# Patient Record
Sex: MALE | Race: WHITE | NOT HISPANIC OR LATINO | URBAN - METROPOLITAN AREA
[De-identification: names, ages, dates, MRNs, and addresses within clinical notes are randomized per-mention and may not be internally consistent; named-entity substitution may affect disease eponyms.]

---

## 2017-05-10 ENCOUNTER — OUTPATIENT (OUTPATIENT)
Dept: OUTPATIENT SERVICES | Facility: HOSPITAL | Age: 52
LOS: 1 days | End: 2017-05-10

## 2017-05-18 ENCOUNTER — OUTPATIENT (OUTPATIENT)
Dept: OUTPATIENT SERVICES | Facility: HOSPITAL | Age: 52
LOS: 1 days | End: 2017-05-18

## 2020-06-19 ENCOUNTER — APPOINTMENT (OUTPATIENT)
Dept: ORTHOPEDIC SURGERY | Facility: CLINIC | Age: 55
End: 2020-06-19
Payer: COMMERCIAL

## 2020-06-19 VITALS
HEART RATE: 89 BPM | SYSTOLIC BLOOD PRESSURE: 130 MMHG | HEIGHT: 72 IN | WEIGHT: 175 LBS | BODY MASS INDEX: 23.7 KG/M2 | DIASTOLIC BLOOD PRESSURE: 87 MMHG | TEMPERATURE: 98.3 F

## 2020-06-19 PROCEDURE — 73030 X-RAY EXAM OF SHOULDER: CPT | Mod: LT

## 2020-06-19 PROCEDURE — 20610 DRAIN/INJ JOINT/BURSA W/O US: CPT | Mod: LT

## 2020-06-19 PROCEDURE — 99203 OFFICE O/P NEW LOW 30 MIN: CPT | Mod: 25

## 2020-06-19 NOTE — PHYSICAL EXAM
[de-identified] : Pt is pleasant 54 yo male, AAOx3 with no apparent distress, 23 BMI.  Physical examination of both shoulders reveals normal contours with no deformity, skin intact, with no signs of infection, no erythema, no swelling, no discoloration, no distal lymphedema, no patholaxity, no muscle atrophy noted. Minimal AC tenderness to palpation, no anterior/posterior capsule pain. ROM of Left shoulder reveals forward flexion to 125 °,  external rotation 35 °,  IR to L1 . Motor strength 5/5 in ER, IR, and FF in the scapular plane with pain on FF. No neurological deficits noted.  [de-identified] : X-rays were ordered, obtained, and interpreted by me today: 4 views of the left shoulder demonstrate no evidence of arthritis, no calcifications, with no acute fracture or dislocation.

## 2020-06-19 NOTE — CONSULT LETTER
[Dear  ___] : Dear  [unfilled], [FreeTextEntry1] : I had the pleasure of evaluating your patient in the office today for evaluation of left shoulder pain secondary to rotator cuff tendinitis. I have enclosed a copy of today's office notes for your charts and for your review.\par \par Sincerely, \par \par Juan Antonio Melvin M.D.\par Professor and \par Department of Orthopedic Surgery\par [facility]

## 2020-06-19 NOTE — DISCUSSION/SUMMARY
[de-identified] : 54 y/o male with left shoulder pain secondary to rotator cuff tendinitis. A lengthy discussion was held regarding the patient’s condition and treatment options including all risks, benefits, prognosis and outcomes of each were discussed in detail. The patient would like to continue with conservative management at this time and was advised on the use of NSAIDS for pain control, icing, activity modification, and possible cortisone injections. A cortisone injection was given today into shoulder and subacromial space. A prescription was provided today for left shoulder MRI and to begin physical therapy.  I will contact him after MRI results are available to discuss further options with him. The patient will contact me if there are any concerns otherwise follow up will be on a prn basis. The patient express understanding and all questions were answered.

## 2020-06-19 NOTE — PROCEDURE
[de-identified] : After careful discussion regarding the risks versus benefits of a corticosteroid injection the patient has elected to proceed with that treatment. Under sterile conditions, the patient received  a left shoulder injection into the subacromial and glenohumeral space with 8 cc of half percent Marcaine without epinephrine and 2 cc of Betamethasone. The site was cleaned and a bandaid was applied. The patient tolerated the injection without problem.\par

## 2020-06-19 NOTE — HISTORY OF PRESENT ILLNESS
[de-identified] : 54 y/o RHD male who is a  with history of 11/13/14 Right shoulder arthroscopic SAD with Laura and partial synovectomy presents with left shoulder pain since February. He denies any injury or trauma. He states that his symptoms are getting worse. He states the pain is a 9/10 yesterday. He gets a lot of muscle spasm. He has decrease ROM and strength. He denies any numbness or tingling.

## 2020-09-17 ENCOUNTER — APPOINTMENT (OUTPATIENT)
Dept: ORTHOPEDIC SURGERY | Facility: CLINIC | Age: 55
End: 2020-09-17
Payer: COMMERCIAL

## 2020-09-17 VITALS — TEMPERATURE: 98.2 F

## 2020-09-17 DIAGNOSIS — M75.82 OTHER SHOULDER LESIONS, LEFT SHOULDER: ICD-10-CM

## 2020-09-17 PROCEDURE — 99214 OFFICE O/P EST MOD 30 MIN: CPT

## 2020-09-18 PROBLEM — M75.82 TENDINITIS OF LEFT ROTATOR CUFF: Status: ACTIVE | Noted: 2020-06-19

## 2020-09-18 NOTE — CONSULT LETTER
[Dear  ___] : Dear  [unfilled], [FreeTextEntry1] : I had the pleasure of evaluating your patient in the office today for evaluation of left shoulder pain secondary to adhesive capsulitis and impingement. The patient would like to proceed with surgical intervention in the form of subacromial decompression and capsular release. Patient will require medical clearance for the aforementioned procedure. The patient would like to I have enclosed a copy of today's office notes for your charts and for your review.\par \par Sincerely, \par \par Juan Antonio Melvin M.D.\par Professor and \par Department of Orthopedic Surgery\par [facility]

## 2020-09-18 NOTE — DISCUSSION/SUMMARY
[Surgical risks reviewed] : Surgical risks reviewed [de-identified] : 56 y/o male with left shoulder pain secondary to adhesive capsulitis and impingement. A lengthy discussion was held regarding the patient’s condition and treatment options including all risks, benefits, prognosis and outcomes of each were discussed in detail. The patient would like to proceed with surgery at this time. Surgery will be tentatively scheduled 10/12/20 for capsular release and subacromial decompression. Discussed that patient will need to pursue aggressive ROM and PT post-operatively to prevent recurrent stiffness. The patient will need medical clearance before proceeding with surgery. All the patient's questions were answered at this time.\par \par \par

## 2020-09-18 NOTE — PHYSICAL EXAM
[de-identified] : Pt is pleasant 54 yo male, AAOx3 with no apparent distress, 23 BMI.  Physical examination of both shoulders reveals normal contours with no deformity, skin intact, with no signs of infection, no erythema, no swelling, no discoloration, no distal lymphedema, no patholaxity, no muscle atrophy noted. ROM of Left shoulder reveals forward flexion to 105 ° (with limitation to passive ROM),  external rotation 20 °,  IR to L4. Motor strength 4/5 in ER, IR, and FF in the scapular plane with pain on FF. No neurological deficits noted. \par  [de-identified] : No additional imaging at this visit.\par \par Prior X-rays: 4 views of the left shoulder demonstrate type 2 acromial morphology, with no evidence of arthritis, no calcifications, with no acute fracture or dislocation. \par \par Prior MRI L Shoulder ([facility], 6/24/20): rotator cuff tendonitis with inferior capsule thickening.

## 2020-09-18 NOTE — HISTORY OF PRESENT ILLNESS
[de-identified] : 56 y/o RHD male who is a  with history of 11/13/14 Right shoulder arthroscopic SAD with Laura and partial synovectomy and was seen in the past for left shoulder tendonitis and was treated with an injection which did help presents with continued pain. He has finished therapy but his shoulder is still stiff. He states the pain is a 2/10 pain at this visit, which he takes Naprosyn. He has decrease ROM and strength. He denies any numbness or tingling.

## 2020-10-01 ENCOUNTER — OUTPATIENT (OUTPATIENT)
Dept: OUTPATIENT SERVICES | Facility: HOSPITAL | Age: 55
LOS: 1 days | End: 2020-10-01
Payer: COMMERCIAL

## 2020-10-01 VITALS
WEIGHT: 171.96 LBS | RESPIRATION RATE: 18 BRPM | OXYGEN SATURATION: 98 % | SYSTOLIC BLOOD PRESSURE: 126 MMHG | HEART RATE: 56 BPM | TEMPERATURE: 97 F | DIASTOLIC BLOOD PRESSURE: 84 MMHG | HEIGHT: 72 IN

## 2020-10-01 DIAGNOSIS — Z01.818 ENCOUNTER FOR OTHER PREPROCEDURAL EXAMINATION: ICD-10-CM

## 2020-10-01 DIAGNOSIS — M75.40 IMPINGEMENT SYNDROME OF UNSPECIFIED SHOULDER: ICD-10-CM

## 2020-10-01 DIAGNOSIS — Z98.890 OTHER SPECIFIED POSTPROCEDURAL STATES: Chronic | ICD-10-CM

## 2020-10-01 DIAGNOSIS — Z90.49 ACQUIRED ABSENCE OF OTHER SPECIFIED PARTS OF DIGESTIVE TRACT: Chronic | ICD-10-CM

## 2020-10-01 DIAGNOSIS — M75.02 ADHESIVE CAPSULITIS OF LEFT SHOULDER: ICD-10-CM

## 2020-10-01 LAB
ANION GAP SERPL CALC-SCNC: 10 MMOL/L — SIGNIFICANT CHANGE UP (ref 5–17)
BUN SERPL-MCNC: 10 MG/DL — SIGNIFICANT CHANGE UP (ref 7–23)
CALCIUM SERPL-MCNC: 9.7 MG/DL — SIGNIFICANT CHANGE UP (ref 8.4–10.5)
CHLORIDE SERPL-SCNC: 98 MMOL/L — SIGNIFICANT CHANGE UP (ref 96–108)
CO2 SERPL-SCNC: 27 MMOL/L — SIGNIFICANT CHANGE UP (ref 22–31)
CREAT SERPL-MCNC: 0.79 MG/DL — SIGNIFICANT CHANGE UP (ref 0.5–1.3)
GLUCOSE SERPL-MCNC: 108 MG/DL — HIGH (ref 70–99)
HCT VFR BLD CALC: 43.8 % — SIGNIFICANT CHANGE UP (ref 39–50)
HGB BLD-MCNC: 15 G/DL — SIGNIFICANT CHANGE UP (ref 13–17)
MCHC RBC-ENTMCNC: 30.4 PG — SIGNIFICANT CHANGE UP (ref 27–34)
MCHC RBC-ENTMCNC: 34.2 GM/DL — SIGNIFICANT CHANGE UP (ref 32–36)
MCV RBC AUTO: 88.7 FL — SIGNIFICANT CHANGE UP (ref 80–100)
NRBC # BLD: 0 /100 WBCS — SIGNIFICANT CHANGE UP (ref 0–0)
PLATELET # BLD AUTO: 239 K/UL — SIGNIFICANT CHANGE UP (ref 150–400)
POTASSIUM SERPL-MCNC: 4.4 MMOL/L — SIGNIFICANT CHANGE UP (ref 3.5–5.3)
POTASSIUM SERPL-SCNC: 4.4 MMOL/L — SIGNIFICANT CHANGE UP (ref 3.5–5.3)
RBC # BLD: 4.94 M/UL — SIGNIFICANT CHANGE UP (ref 4.2–5.8)
RBC # FLD: 11.9 % — SIGNIFICANT CHANGE UP (ref 10.3–14.5)
SODIUM SERPL-SCNC: 135 MMOL/L — SIGNIFICANT CHANGE UP (ref 135–145)
WBC # BLD: 7.88 K/UL — SIGNIFICANT CHANGE UP (ref 3.8–10.5)
WBC # FLD AUTO: 7.88 K/UL — SIGNIFICANT CHANGE UP (ref 3.8–10.5)

## 2020-10-01 PROCEDURE — G0463: CPT

## 2020-10-01 PROCEDURE — 85027 COMPLETE CBC AUTOMATED: CPT

## 2020-10-01 PROCEDURE — 80048 BASIC METABOLIC PNL TOTAL CA: CPT

## 2020-10-01 RX ORDER — SODIUM CHLORIDE 9 MG/ML
3 INJECTION INTRAMUSCULAR; INTRAVENOUS; SUBCUTANEOUS EVERY 8 HOURS
Refills: 0 | Status: DISCONTINUED | OUTPATIENT
Start: 2020-10-08 | End: 2020-10-23

## 2020-10-01 RX ORDER — LIDOCAINE HCL 20 MG/ML
0.2 VIAL (ML) INJECTION ONCE
Refills: 0 | Status: DISCONTINUED | OUTPATIENT
Start: 2020-10-08 | End: 2020-10-23

## 2020-10-01 NOTE — H&P PST ADULT - NSICDXPASTMEDICALHX_GEN_ALL_CORE_FT
PAST MEDICAL HISTORY:  Capsulitis, adhesive shoulder     History of esophageal spasm 3-4 years ago   resolved     PAST MEDICAL HISTORY:  Bulging lumbar disc L4-5    Capsulitis, adhesive shoulder     History of esophageal spasm 3-4 years ago   resolved

## 2020-10-01 NOTE — H&P PST ADULT - FALL HARM RISK TYPE OF ASSESSMENT
Problem: Patient Care Overview (Adult)  Goal: Plan of Care Review  Outcome: Ongoing (interventions implemented as appropriate)    11/03/17 1055   Coping/Psychosocial Response Interventions   Plan Of Care Reviewed With patient   Patient Care Overview   Progress improving   Outcome Evaluation   Outcome Summary/Follow up Plan less anxious           
Problem: Patient Care Overview (Adult)  Goal: Plan of Care Review  Outcome: Ongoing (interventions implemented as appropriate)    11/03/17 1234   Coping/Psychosocial Response Interventions   Plan Of Care Reviewed With patient   Patient Care Overview   Progress improving   Outcome Evaluation   Outcome Summary/Follow up Plan prn meds as tolerated meets pacu discharge criteria         Problem: Perioperative Period (Adult)  Goal: Signs and Symptoms of Listed Potential Problems Will be Absent or Manageable (Perioperative Period)  Outcome: Ongoing (interventions implemented as appropriate)      
Problem: Patient Care Overview (Adult)  Goal: Plan of Care Review  Outcome: Outcome(s) achieved Date Met:  11/03/17 11/03/17 1416   Coping/Psychosocial Response Interventions   Plan Of Care Reviewed With patient;family   Patient Care Overview   Progress improving   Outcome Evaluation   Outcome Summary/Follow up Plan dischg criteria met           
Problem: Perioperative Period (Adult)  Goal: Signs and Symptoms of Listed Potential Problems Will be Absent or Manageable (Perioperative Period)  Outcome: Ongoing (interventions implemented as appropriate)    11/03/17 4418   Perioperative Period   Problems Assessed (Perioperative Period) pain;hypothermia;hypoxia/hypoxemia;perioperative injury;situational response   Problems Present (Perioperative Period) situational response           
Problem: Perioperative Period (Adult)  Goal: Signs and Symptoms of Listed Potential Problems Will be Absent or Manageable (Perioperative Period)  Outcome: Outcome(s) achieved Date Met:  11/03/17      
Admission

## 2020-10-01 NOTE — H&P PST ADULT - HISTORY OF PRESENT ILLNESS
55 year old male is being seen in preadmission testing for scheduled left shoulder arthroscopy adhesiolysis with subacromial decompression on 10/8/2020. History of esophageal spasm 3-4 years ago (resolved)      Scheduled for covid-19 swab on 10/5/2020, [facility] 55 year old male is being seen in preadmission testing for scheduled left shoulder arthroscopy adhesiolysis with subacromial decompression on 10/8/2020. History of esophageal spasm 3-4 years ago (resolved), anxiety disorders, bulging lumbar discs (L4-5)      Scheduled for covid-19 swab on 10/5/2020, [facility] 55 year old male is being seen in preadmission testing for scheduled left shoulder arthroscopy adhesiolysis with subacromial decompression on 10/8/2020. History of esophageal spasm 3-4 years ago (resolved), anxiety disorders, bulging lumbar discs (L4-5).      Scheduled for covid-19 swab on 10/5/2020, [facility]

## 2020-10-01 NOTE — H&P PST ADULT - NSICDXPASTSURGICALHX_GEN_ALL_CORE_FT
PAST SURGICAL HISTORY:  H/O meniscectomy of right knee     H/O shoulder surgery right shoulder    History of cholecystectomy 2 years ago    History of knee surgery left

## 2020-10-01 NOTE — H&P PST ADULT - ASSESSMENT
This is a 55 year old male c/o left shoulder pain, uses marijuana with some relief, pain increases with certain movement. scheduled for surgery on 10/8/2020.

## 2020-10-04 DIAGNOSIS — Z01.818 ENCOUNTER FOR OTHER PREPROCEDURAL EXAMINATION: ICD-10-CM

## 2020-10-05 ENCOUNTER — APPOINTMENT (OUTPATIENT)
Dept: DISASTER EMERGENCY | Facility: CLINIC | Age: 55
End: 2020-10-05

## 2020-10-07 ENCOUNTER — TRANSCRIPTION ENCOUNTER (OUTPATIENT)
Age: 55
End: 2020-10-07

## 2020-10-07 ENCOUNTER — APPOINTMENT (OUTPATIENT)
Dept: DISASTER EMERGENCY | Facility: CLINIC | Age: 55
End: 2020-10-07

## 2020-10-07 ENCOUNTER — LABORATORY RESULT (OUTPATIENT)
Age: 55
End: 2020-10-07

## 2020-10-07 PROBLEM — Z87.19 PERSONAL HISTORY OF OTHER DISEASES OF THE DIGESTIVE SYSTEM: Chronic | Status: ACTIVE | Noted: 2020-10-01

## 2020-10-07 PROBLEM — M51.26 OTHER INTERVERTEBRAL DISC DISPLACEMENT, LUMBAR REGION: Chronic | Status: ACTIVE | Noted: 2020-10-01

## 2020-10-07 PROBLEM — M75.00 ADHESIVE CAPSULITIS OF UNSPECIFIED SHOULDER: Chronic | Status: ACTIVE | Noted: 2020-10-01

## 2020-10-08 ENCOUNTER — APPOINTMENT (OUTPATIENT)
Dept: ORTHOPEDIC SURGERY | Facility: HOSPITAL | Age: 55
End: 2020-10-08

## 2020-10-08 ENCOUNTER — OUTPATIENT (OUTPATIENT)
Dept: OUTPATIENT SERVICES | Facility: HOSPITAL | Age: 55
LOS: 1 days | End: 2020-10-08
Payer: COMMERCIAL

## 2020-10-08 VITALS
RESPIRATION RATE: 19 BRPM | DIASTOLIC BLOOD PRESSURE: 66 MMHG | HEART RATE: 72 BPM | OXYGEN SATURATION: 96 % | SYSTOLIC BLOOD PRESSURE: 126 MMHG

## 2020-10-08 VITALS
WEIGHT: 171.96 LBS | OXYGEN SATURATION: 97 % | TEMPERATURE: 98 F | RESPIRATION RATE: 18 BRPM | HEART RATE: 50 BPM | DIASTOLIC BLOOD PRESSURE: 80 MMHG | HEIGHT: 72 IN | SYSTOLIC BLOOD PRESSURE: 121 MMHG

## 2020-10-08 DIAGNOSIS — Z98.890 OTHER SPECIFIED POSTPROCEDURAL STATES: Chronic | ICD-10-CM

## 2020-10-08 DIAGNOSIS — M75.40 IMPINGEMENT SYNDROME OF UNSPECIFIED SHOULDER: ICD-10-CM

## 2020-10-08 DIAGNOSIS — Z90.49 ACQUIRED ABSENCE OF OTHER SPECIFIED PARTS OF DIGESTIVE TRACT: Chronic | ICD-10-CM

## 2020-10-08 DIAGNOSIS — M75.02 ADHESIVE CAPSULITIS OF LEFT SHOULDER: ICD-10-CM

## 2020-10-08 PROCEDURE — 29826 SHO ARTHRS SRG DECOMPRESSION: CPT | Mod: LT

## 2020-10-08 PROCEDURE — 29825 SHO ARTHRS SRG LSS&RESCJ ADS: CPT | Mod: LT

## 2020-10-08 RX ORDER — LOPERAMIDE HCL 2 MG
1 TABLET ORAL
Qty: 0 | Refills: 0 | DISCHARGE

## 2020-10-08 RX ORDER — ALPRAZOLAM 0.25 MG
1 TABLET ORAL
Qty: 0 | Refills: 0 | DISCHARGE

## 2020-10-08 RX ORDER — CHLORHEXIDINE GLUCONATE 213 G/1000ML
1 SOLUTION TOPICAL ONCE
Refills: 0 | Status: COMPLETED | OUTPATIENT
Start: 2020-10-08 | End: 2020-10-08

## 2020-10-08 RX ORDER — SODIUM CHLORIDE 9 MG/ML
1000 INJECTION, SOLUTION INTRAVENOUS
Refills: 0 | Status: DISCONTINUED | OUTPATIENT
Start: 2020-10-08 | End: 2020-10-23

## 2020-10-08 RX ORDER — OXYCODONE HYDROCHLORIDE 5 MG/1
5 TABLET ORAL ONCE
Refills: 0 | Status: DISCONTINUED | OUTPATIENT
Start: 2020-10-08 | End: 2020-10-08

## 2020-10-08 RX ORDER — ONDANSETRON 8 MG/1
4 TABLET, FILM COATED ORAL ONCE
Refills: 0 | Status: DISCONTINUED | OUTPATIENT
Start: 2020-10-08 | End: 2020-10-23

## 2020-10-08 RX ADMIN — CHLORHEXIDINE GLUCONATE 1 APPLICATION(S): 213 SOLUTION TOPICAL at 13:23

## 2020-10-08 NOTE — ASU DISCHARGE PLAN (ADULT/PEDIATRIC) - CALL YOUR DOCTOR IF YOU HAVE ANY OF THE FOLLOWING:
Wound/Surgical Site with redness, or foul smelling discharge or pus/Swelling that gets worse/Bleeding that does not stop/Pain not relieved by Medications/Fever greater than (need to indicate Fahrenheit or Celsius)

## 2020-10-08 NOTE — ASU DISCHARGE PLAN (ADULT/PEDIATRIC) - CARE PROVIDER_API CALL
Juan Antonio Melvin)  Orthopaedic Surgery  [address], [address]  [place] [address]  Phone: [phone]  Fax: [phone]  Follow Up Time:

## 2020-10-08 NOTE — BRIEF OPERATIVE NOTE - NSICDXBRIEFPOSTOP_GEN_ALL_CORE_FT
POST-OP DIAGNOSIS:  Secondary adhesive capsulitis of left shoulder 08-Oct-2020 17:14:58  Elin Ryan

## 2020-10-08 NOTE — ASU DISCHARGE PLAN (ADULT/PEDIATRIC) - ASU DC SPECIAL INSTRUCTIONSFT
Follow up with Dr Melvin in 7-14 days. Call office for appointment. Take medications as prescribed. Keep dressing clean, dry, and intact. Rest, ice, and elevate affected extremity. Weightbearing as tolerated left upper extremity in sling for comfort. Further instructions detailed in packet.

## 2020-10-08 NOTE — ASU PATIENT PROFILE, ADULT - PMH
Bulging lumbar disc  L4-5  Capsulitis, adhesive shoulder    History of esophageal spasm  3-4 years ago   resolved

## 2020-10-08 NOTE — ASU PATIENT PROFILE, ADULT - PSH
H/O meniscectomy of right knee    H/O shoulder surgery  right shoulder  History of cholecystectomy  2 years ago  History of knee surgery  left

## 2020-10-08 NOTE — ASU DISCHARGE PLAN (ADULT/PEDIATRIC) - NURSING INSTRUCTIONS
Next dose of Tylenol will be on or after ___________ ,today/tonight, If needed for pain/cramps. Your first dose of Tylenol was given at ___________. Do not exceed more than 4000mg of Tylenol in one 24 hour setting.   Next dose of NSAIDS (Motrin/Alleve) will be on or after ___________ today if needed for pain/cramps. Next dose of Tylenol will be on or after ___11:00PM________ ,today/tonight, If needed for pain/cramps. Your first dose of Tylenol was given at __3:51PM_________. Do not exceed more than 4000mg of Tylenol in one 24 hour setting.   Next dose of NSAIDS (Motrin/Alleve) will be on or after ___8:00________ today if needed for pain/cramps.

## 2020-10-27 ENCOUNTER — APPOINTMENT (OUTPATIENT)
Dept: ORTHOPEDIC SURGERY | Facility: CLINIC | Age: 55
End: 2020-10-27
Payer: COMMERCIAL

## 2020-10-27 VITALS — TEMPERATURE: 97.8 F

## 2020-10-27 DIAGNOSIS — M75.02 ADHESIVE CAPSULITIS OF LEFT SHOULDER: ICD-10-CM

## 2020-10-27 PROCEDURE — 73030 X-RAY EXAM OF SHOULDER: CPT | Mod: LT

## 2020-10-27 PROCEDURE — 99024 POSTOP FOLLOW-UP VISIT: CPT

## 2020-10-27 PROCEDURE — 99072 ADDL SUPL MATRL&STAF TM PHE: CPT

## 2020-10-27 NOTE — REASON FOR VISIT
[Post Operative Visit] : a post operative visit for [Aftercare Following Surgery] : aftercare following surgery [FreeTextEntry2] : POSTOP s/p 10/08/2020 Left shoulder arthroscopic capsular release and SAD

## 2020-10-27 NOTE — HISTORY OF PRESENT ILLNESS
[de-identified] : 54 y/o male s/p 10/08/2020 Left shoulder arthroscopic capsular release and SAD is overall doing well. He is going to PT 5 x week at [facility] [place]. He has no other complaints.

## 2020-10-27 NOTE — RETURN TO WORK/SCHOOL
[Return Date: _____] : as of [unfilled].  This has been discussed in detail with ~Elijah~ and ~he/she~ understands this. [FreeTextEntry1] : Patient was evaluated today in the office s/p 10/08/2020 Left shoulder arthroscopic capsular release and SAD. Return to work 11/9/20.\par \par Sincerely, \par \par Juan Antonio Melvin M.D.\par Professor and \par Department of Orthopedic Surgery\par [facility] [place]\par

## 2020-10-27 NOTE — PHYSICAL EXAM
[de-identified] : Pt is pleasant 54 yo male, AAOx3 with no apparent distress, 23 BMI.  Physical examination of both shoulders reveals normal contours with no deformity, well-healed surgical incisions with no signs of infection, no erythema, no swelling, no discoloration, no distal lymphedema, no patholaxity, no muscle atrophy noted. ROM of Left shoulder reveals forward flexion to 130 °,  external rotation 35°,  IR to L1. Motor strength 5/5 in ER, IR, and FF in the scapular plane with pain on FF. No neurological deficits noted. \par  [de-identified] : X-rays were ordered, obtained, and interpreted by me today (10/27/20): 4 views of the left shoulder demonstrate s/p subacromial decompression, with no acute fracture or dislocation.\par \par Prior X-rays: 4 views of the left shoulder demonstrate type 2 acromial morphology, with no evidence of arthritis, no calcifications, with no acute fracture or dislocation. \par \par Prior MRI L Shoulder ([facility], 6/24/20): rotator cuff tendonitis with inferior capsule thickening.

## 2020-10-27 NOTE — CONSULT LETTER
[Dear  ___] : Dear  [unfilled], [FreeTextEntry1] : I had the pleasure of evaluating your patient in the office today for routine post-operative visit  s/p 10/08/2020 Left shoulder arthroscopic capsular release and SAD.  The patient would like to I have enclosed a copy of today's office notes for your charts and for your review.\par \par Sincerely, \par \par Juan Antonio Melvin M.D.\par Professor and \par Department of Orthopedic Surgery\par [facility]

## 2020-10-27 NOTE — DISCUSSION/SUMMARY
[de-identified] : 56 y/o male s/p 10/08/2020 Left shoulder arthroscopic capsular release and SAD. The arthroscopic images were reviewed with the patient. A return to work letter was provided for 11/9/20. The patient will contact me in improvement and will otherwise follow up in 2 months. Pt expressed understanding and all questions were answered.\par

## 2020-11-16 NOTE — H&P PST ADULT - OPHTHALMOLOGIC
The patient/caregiver verbalized understanding of how to care for the injured extremity with splint/Pre-application: Motor, sensory, and vascular responses intact in the injured extremity./Post-application: Motor, sensory, and vascular responses intact in the injured extremity.
negative

## 2021-09-28 ENCOUNTER — TRANSCRIPTION ENCOUNTER (OUTPATIENT)
Age: 56
End: 2021-09-28

## 2021-12-05 NOTE — ASU PATIENT PROFILE, ADULT - MEDICATION HERBAL REMEDIES, PROFILE
pt with h/o possible TACO on prior admission after receiving plt transfusion   - c/w lasix as with transfusions no

## 2022-03-31 NOTE — H&P PST ADULT - PAIN CHRONIC, PROFILE
yes Partial Purse String (Intermediate) Text: Given the location of the defect and the characteristics of the surrounding skin an intermediate purse string closure was deemed most appropriate.  Undermining was performed circumferentially around the surgical defect.  A purse string suture was then placed and tightened. Wound tension of the circular defect prevented complete closure of the wound.

## 2022-07-31 ENCOUNTER — NON-APPOINTMENT (OUTPATIENT)
Age: 57
End: 2022-07-31

## 2022-08-10 NOTE — H&P PST ADULT - HEALTH CARE MAINTENANCE
Date Form Received by Disability:  8/ 3/ 2022  Authorization?   Yes  Date sent for auth:    Date auth returned:    Type of form:  Disability  Company:  [employer]  When form complete:  Fax:  [phone]  Comments no yearly influenza vaccine
